# Patient Record
Sex: FEMALE
[De-identification: names, ages, dates, MRNs, and addresses within clinical notes are randomized per-mention and may not be internally consistent; named-entity substitution may affect disease eponyms.]

---

## 2018-08-07 ENCOUNTER — HOSPITAL ENCOUNTER (OUTPATIENT)
Dept: HOSPITAL 92 - ULT | Age: 38
Discharge: HOME | End: 2018-08-07
Attending: FAMILY MEDICINE
Payer: COMMERCIAL

## 2018-08-07 DIAGNOSIS — D64.9: ICD-10-CM

## 2018-08-07 DIAGNOSIS — N92.0: Primary | ICD-10-CM

## 2018-08-07 PROCEDURE — 76856 US EXAM PELVIC COMPLETE: CPT

## 2018-08-07 NOTE — ULT
PELVIC ULTRASOUND:

 

HISTORY:

Anemia.  Heavy periods.

 

COMPARISON:

None.

 

TECHNIQUE:

Transabdominal and endovaginal imaging of the pelvis is performed.  The right ovary was interrogated 
with gray-scale, color-flow, Doppler imaging, and spectral wave-form analysis.

 

FINDINGS:

The uterus is identified, measuring 4.1 x 4.5 x 9 cm.  There are no myometrial masses.

 

The endometrium has a normal echotexture, measuring 0.6 cm.  A small amount of fluid in the endometri
um, at the level of the fundus.

 

The left ovary is not appreciated.  No masses or fluid in the left adnexa.

 

The right ovary is limited in evaluation, measuring 2.4 x 2.4 x 2.5 cm.

 

OVARIAN DOPPLER:  There is vascular flow to the right ovary.

 

IMPRESSION:

1.  Limited evaluation of the right ovary.  Nonvisulaization of the left ovary.

 

2.  Normal echotexture and diameter of the endometrium.  

 

3.  Given the patient's history, consider pelvic MRI.

 

POS: JESSIE